# Patient Record
Sex: FEMALE | Race: WHITE | URBAN - METROPOLITAN AREA
[De-identification: names, ages, dates, MRNs, and addresses within clinical notes are randomized per-mention and may not be internally consistent; named-entity substitution may affect disease eponyms.]

---

## 2017-02-14 ENCOUNTER — IMPORTED ENCOUNTER (OUTPATIENT)
Dept: URBAN - METROPOLITAN AREA CLINIC 50 | Facility: CLINIC | Age: 62
End: 2017-02-14

## 2017-04-11 ENCOUNTER — IMPORTED ENCOUNTER (OUTPATIENT)
Dept: URBAN - METROPOLITAN AREA CLINIC 50 | Facility: CLINIC | Age: 62
End: 2017-04-11

## 2017-04-24 ENCOUNTER — IMPORTED ENCOUNTER (OUTPATIENT)
Dept: URBAN - METROPOLITAN AREA CLINIC 50 | Facility: CLINIC | Age: 62
End: 2017-04-24

## 2017-08-15 ENCOUNTER — IMPORTED ENCOUNTER (OUTPATIENT)
Dept: URBAN - METROPOLITAN AREA CLINIC 50 | Facility: CLINIC | Age: 62
End: 2017-08-15

## 2019-04-02 ENCOUNTER — IMPORTED ENCOUNTER (OUTPATIENT)
Dept: URBAN - METROPOLITAN AREA CLINIC 50 | Facility: CLINIC | Age: 64
End: 2019-04-02

## 2021-04-17 ASSESSMENT — VISUAL ACUITY
OS_CC: J2@ 16 IN
OD_SC: 20/70
OD_CC: J2@ 16 IN
OD_PH: 20/25
OS_CC: J2@ 14 IN
OS_SC: 20/50-1
OS_CC: J1+ (-2)
OS_SC: 20/30-2
OD_CC: J2@ 16 IN
OD_SC: 20/30
OD_PH: 20/30
OD_PH: @ 16 IN
OS_SC: 20/30
OD_SC: 20/50-1
OS_CC: J2@ 16 IN
OS_PH: 20/25-2
OD_CC: J1+ (-2)
OD_PH: 20/20
OD_PH: 20/25
OD_SC: 20/100-2
OS_PH: @ 16 IN
OS_SC: 20/30
OD_CC: J2@ 14 IN
OS_PH: 20/20

## 2021-04-17 ASSESSMENT — TONOMETRY
OD_IOP_MMHG: 18
OS_IOP_MMHG: 15
OS_IOP_MMHG: 18
OD_IOP_MMHG: 16
OD_IOP_MMHG: 17
OS_IOP_MMHG: 23
OD_IOP_MMHG: 18
OS_IOP_MMHG: 18

## 2022-08-17 ENCOUNTER — COMPREHENSIVE EXAM (OUTPATIENT)
Dept: URBAN - METROPOLITAN AREA CLINIC 52 | Facility: CLINIC | Age: 67
End: 2022-08-17

## 2022-08-17 DIAGNOSIS — H35.373: ICD-10-CM

## 2022-08-17 DIAGNOSIS — Z01.01: ICD-10-CM

## 2022-08-17 PROCEDURE — 92015 DETERMINE REFRACTIVE STATE: CPT

## 2022-08-17 PROCEDURE — 92134 CPTRZ OPH DX IMG PST SGM RTA: CPT

## 2022-08-17 PROCEDURE — 92014 COMPRE OPH EXAM EST PT 1/>: CPT

## 2022-08-17 ASSESSMENT — VISUAL ACUITY
OU_CC: J1+
OD_CC: 20/25
OU_CC: 20/25
OS_CC: 20/50
OS_PH: 20/30

## 2022-08-17 ASSESSMENT — TONOMETRY
OS_IOP_MMHG: 15
OD_IOP_MMHG: 15

## 2023-08-19 NOTE — PATIENT DISCUSSION
"""Call if vision decreases or RD warning signs increases/RD warnings given.  No signs of retinal tear none

## 2023-08-22 ENCOUNTER — COMPREHENSIVE EXAM (OUTPATIENT)
Dept: URBAN - METROPOLITAN AREA CLINIC 52 | Facility: CLINIC | Age: 68
End: 2023-08-22

## 2023-08-22 DIAGNOSIS — H43.813: ICD-10-CM

## 2023-08-22 DIAGNOSIS — Z01.01: ICD-10-CM

## 2023-08-22 DIAGNOSIS — H16.223: ICD-10-CM

## 2023-08-22 DIAGNOSIS — H02.831: ICD-10-CM

## 2023-08-22 DIAGNOSIS — H02.834: ICD-10-CM

## 2023-08-22 DIAGNOSIS — H35.373: ICD-10-CM

## 2023-08-22 PROCEDURE — 92134 CPTRZ OPH DX IMG PST SGM RTA: CPT

## 2023-08-22 PROCEDURE — 92014 COMPRE OPH EXAM EST PT 1/>: CPT

## 2023-08-22 PROCEDURE — 92015 DETERMINE REFRACTIVE STATE: CPT

## 2023-08-22 ASSESSMENT — KERATOMETRY
OD_AXISANGLE_DEGREES: 161
OD_AXISANGLE2_DEGREES: 71
OS_K1POWER_DIOPTERS: 41.50
OD_K1POWER_DIOPTERS: 42.00
OS_AXISANGLE2_DEGREES: 90
OS_AXISANGLE_DEGREES: 180
OD_K2POWER_DIOPTERS: 42.25
OS_K2POWER_DIOPTERS: 41.50

## 2023-08-22 ASSESSMENT — VISUAL ACUITY
OS_CC: 20/20
OU_CC: 20/25@24"
OU_SC: 20/25@24"
OU_SC: 20/60
OD_CC: 20/25
OU_CC: 20/20
OU_SC: J2@16"
OU_CC: J1+@16"

## 2023-08-22 ASSESSMENT — TONOMETRY
OS_IOP_MMHG: 15
OD_IOP_MMHG: 15

## 2024-09-16 ENCOUNTER — COMPREHENSIVE EXAM (OUTPATIENT)
Dept: URBAN - METROPOLITAN AREA CLINIC 52 | Facility: CLINIC | Age: 69
End: 2024-09-16

## 2024-09-16 DIAGNOSIS — H02.834: ICD-10-CM

## 2024-09-16 DIAGNOSIS — H43.813: ICD-10-CM

## 2024-09-16 DIAGNOSIS — H16.223: ICD-10-CM

## 2024-09-16 DIAGNOSIS — H35.371: ICD-10-CM

## 2024-09-16 DIAGNOSIS — H52.4: ICD-10-CM

## 2024-09-16 DIAGNOSIS — H02.831: ICD-10-CM

## 2024-09-16 DIAGNOSIS — Z01.01: ICD-10-CM

## 2024-09-16 PROCEDURE — 92015 DETERMINE REFRACTIVE STATE: CPT

## 2024-09-16 PROCEDURE — 92014 COMPRE OPH EXAM EST PT 1/>: CPT
